# Patient Record
Sex: FEMALE | URBAN - METROPOLITAN AREA
[De-identification: names, ages, dates, MRNs, and addresses within clinical notes are randomized per-mention and may not be internally consistent; named-entity substitution may affect disease eponyms.]

---
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
## 2022-09-15 ENCOUNTER — APPOINTMENT (RX ONLY)
Dept: URBAN - METROPOLITAN AREA CLINIC 374 | Facility: CLINIC | Age: 22
Setting detail: DERMATOLOGY
End: 2022-09-15

## 2022-09-15 DIAGNOSIS — L71.0 PERIORAL DERMATITIS: ICD-10-CM

## 2022-09-15 PROCEDURE — ? COUNSELING

## 2022-09-15 PROCEDURE — 99203 OFFICE O/P NEW LOW 30 MIN: CPT

## 2022-09-15 PROCEDURE — ? PRESCRIPTION MEDICATION MANAGEMENT

## 2022-09-15 PROCEDURE — ? PRESCRIPTION

## 2022-09-15 RX ORDER — DOXYCYCLINE 100 MG/1
TABLET, FILM COATED ORAL QDAY
Qty: 30 | Refills: 1 | Status: ERX | COMMUNITY
Start: 2022-09-15

## 2022-09-15 RX ORDER — CLINDAMYCIN PHOSPHATE 10 MG/ML
LOTION TOPICAL QAM
Qty: 60 | Refills: 3 | Status: ERX | COMMUNITY
Start: 2022-09-15

## 2022-09-15 RX ORDER — HYDROCORTISONE 25 MG/ML
LOTION TOPICAL QHS
Qty: 118 | Refills: 3 | Status: ERX | COMMUNITY
Start: 2022-09-15

## 2022-09-15 RX ADMIN — HYDROCORTISONE: 25 LOTION TOPICAL at 00:00

## 2022-09-15 RX ADMIN — CLINDAMYCIN PHOSPHATE: 10 LOTION TOPICAL at 00:00

## 2022-09-15 RX ADMIN — DOXYCYCLINE: 100 TABLET, FILM COATED ORAL at 00:00

## 2022-09-15 ASSESSMENT — LOCATION DETAILED DESCRIPTION DERM
LOCATION DETAILED: NASAL DORSUM
LOCATION DETAILED: LEFT INFERIOR MEDIAL MALAR CHEEK
LOCATION DETAILED: RIGHT INFERIOR MEDIAL MALAR CHEEK

## 2022-09-15 ASSESSMENT — LOCATION ZONE DERM
LOCATION ZONE: NOSE
LOCATION ZONE: FACE

## 2022-09-15 ASSESSMENT — LOCATION SIMPLE DESCRIPTION DERM
LOCATION SIMPLE: RIGHT CHEEK
LOCATION SIMPLE: NOSE
LOCATION SIMPLE: LEFT CHEEK

## 2022-09-15 NOTE — PROCEDURE: PRESCRIPTION MEDICATION MANAGEMENT
Initiate Treatment: doxycycline monohydrate 100 mg tablet: Take 1 tab po QDAY with food x2 weeks\\n\\nclindamycin 1 % lotion: Apply a thin layer to affected area of face QAM until clear then DC\\n\\nhydrocortisone 2.5 % lotion: Apply a thin layer to AA of face QHS
Detail Level: Zone
Render In Strict Bullet Format?: No

## 2024-08-02 ENCOUNTER — TELEPHONE (OUTPATIENT)
Age: 24
End: 2024-08-02

## 2024-08-02 ENCOUNTER — OFFICE VISIT (OUTPATIENT)
Age: 24
End: 2024-08-02
Payer: COMMERCIAL

## 2024-08-02 VITALS
HEIGHT: 67 IN | BODY MASS INDEX: 18.27 KG/M2 | WEIGHT: 116.4 LBS | DIASTOLIC BLOOD PRESSURE: 70 MMHG | SYSTOLIC BLOOD PRESSURE: 118 MMHG

## 2024-08-02 DIAGNOSIS — E44.1 MILD PROTEIN-CALORIE MALNUTRITION (HCC): ICD-10-CM

## 2024-08-02 DIAGNOSIS — Z87.11 HX OF GASTRIC ULCER: ICD-10-CM

## 2024-08-02 DIAGNOSIS — R12 HEARTBURN: Primary | ICD-10-CM

## 2024-08-02 PROBLEM — K25.9 GASTRIC ULCER: Status: ACTIVE | Noted: 2021-10-07

## 2024-08-02 PROBLEM — H81.10 BPPV (BENIGN PAROXYSMAL POSITIONAL VERTIGO): Status: ACTIVE | Noted: 2018-11-15

## 2024-08-02 PROBLEM — E46 PROTEIN-CALORIE MALNUTRITION (HCC): Status: ACTIVE | Noted: 2021-10-07

## 2024-08-02 PROBLEM — K25.9 GASTRIC ULCER: Status: RESOLVED | Noted: 2021-10-07 | Resolved: 2024-08-02

## 2024-08-02 PROCEDURE — 99204 OFFICE O/P NEW MOD 45 MIN: CPT | Performed by: PHYSICIAN ASSISTANT

## 2024-08-02 RX ORDER — FAMOTIDINE 40 MG/1
40 TABLET, FILM COATED ORAL 2 TIMES DAILY
Qty: 60 TABLET | Refills: 1 | Status: SHIPPED | OUTPATIENT
Start: 2024-08-02

## 2024-08-02 RX ORDER — LANSOPRAZOLE 30 MG/1
CAPSULE, DELAYED RELEASE ORAL
COMMUNITY
Start: 2024-08-01

## 2024-08-02 NOTE — TELEPHONE ENCOUNTER
Scheduled date of EGD(as of today): 8/6/24  Physician performing EGD: DEEPA  Location of EGD: [facility]  Instructions reviewed with patient by: VIOLA  Clearances: N

## 2024-08-02 NOTE — PROGRESS NOTES
Formerly [facility] - Outpatient New Patient Initial Visit  Mariaelena Adames 24 y.o. female MRN: [id number]  Encounter: [id number]    ASSESSMENT AND PLAN:    1. Heartburn  New, daily, severe. Hx/o gastric ulcer  Recommend EGD to assess for erosive esophagitis, GERD, PUD, H pylori. Low suspicion for gastroparesis. Patient is agreeable. Procedure risks including perforation, bleeding, infection, missed lesion and preparation discussed in detail. No contraindications to perform procedure at [facility].   START famotidine 40 mg BID  Further medication recommendations pending EGD findings. Will hold off on PPI anticipating possible H pylori bx if PUD noted  Follow up in 2 weeks after EGD  - famotidine (PEPCID) 40 MG tablet; Take 1 tablet (40 mg total) by mouth 2 (two) times a day With breakfast and dinner  Dispense: 60 tablet; Refill: 1  - EGD; Future    2. Hx of gastric ulcer  Dx on EGD 10/7/2021 for hematemesis, treated with cautery. Bx negative for H pylori, CMV, metaplasia  - EGD; Future    3. Mild protein-calorie malnutrition (HCC)  Mild Protein Calorie Malnutrition in the setting of severe heartburn as evidenced by low BMI, treated with high protein diet.     Return for EGD.    Chief Complaint   Patient presents with    Heartburn     Pt has been experiencing significant heartburn for the last month. Previously diagnosed with peptic ulcer in 2021. Pt notes she is eating a very bland diet. She went to [facility] yesterday, prescribed Prevacid, has not started taking.       HPI:   Accompanied by self and history is obtained from self.    Mariaelena Adames is a 24 y.o. year old female with a PMH significant for peptic ulcer (2021) who presents as a new patient for initial evaluation of new heartburn.    HPI    Heartburn  Ongoing x1 month, every day  Problems with anything she eats, starts within an hour  Previously might have 1 day out of a month with heartburn but never like  this  Took 2 weeks of Prilosec OTC, got worse again when 2-week course complete  Mild relief with laying down  No wt loss    When dx with ulcer in 2021, woke up vomiting blood  Had 10/7/202, gastric ulcer treated with cautery  Bx negative for H pylori, CMV, metaplasia  Felt to be 2/2 stress    GI History:  Previous issues: as above  Blood thinners: ASA: no antiplatelet: no anticoagulation: no  NSAID use: none  Caffeine use: none  Tobacco/nicotine use: none  EtOH use: none  Cannabis use: none    Abdominal Surgical Hx: None  Family Hx: Denies first degree relatives with GI malignancies.  GI procedure Hx:  EGD: 10/7/202: gastric ulcer treated with cautery; bx negative for H pylori, CMV, metaplasia  Colonoscopy: none  GI imaging Hx: None    Review of Systems   Constitutional:  Negative for appetite change, chills, fever and unexpected weight change.   HENT:  Negative for sore throat, trouble swallowing and voice change.    Respiratory:  Negative for cough and choking.    Cardiovascular:  Negative for chest pain and leg swelling.   Gastrointestinal:  Positive for nausea. Negative for abdominal distention, abdominal pain, anal bleeding, blood in stool, constipation, diarrhea, rectal pain and vomiting.        +Heartburn, -Regurgitation   Skin:  Negative for pallor and rash.   Neurological:  Negative for weakness, light-headedness and headaches.   Psychiatric/Behavioral:  Negative for confusion and sleep disturbance. The patient is not nervous/anxious.        Historical Information   Past Medical History:   Diagnosis Date    Gastric ulcer 2021     Past Surgical History:   Procedure Laterality Date    UPPER GASTROINTESTINAL ENDOSCOPY  2021    [facility] [place]     Social History     Substance and Sexual Activity   Alcohol Use Never     Social History     Substance and Sexual Activity   Drug Use Never     Social History     Tobacco Use   Smoking Status Never    Passive exposure: Never   Smokeless Tobacco Never  "    Family History   Problem Relation Age of Onset    No Known Problems Mother     No Known Problems Father     No Known Problems Maternal Grandmother     No Known Problems Maternal Grandfather     No Known Problems Paternal Grandmother     No Known Problems Paternal Grandfather        Meds/Allergies     Current Outpatient Medications:     famotidine (PEPCID) 40 MG tablet    lansoprazole (PREVACID) 30 mg capsule    Allergies   Allergen Reactions    Sulfa Antibiotics Rash     uticaria       PHYSICAL EXAM:    Blood pressure 118/70, height 5' 7\" (1.702 m), weight 52.8 kg (116 lb 6.4 oz). Body mass index is 18.23 kg/m².    Physical Exam  Vitals and nursing note reviewed.   Constitutional:       General: She is not in acute distress.     Appearance: She is not toxic-appearing.   HENT:      Head: Normocephalic.      Mouth/Throat:      Mouth: Mucous membranes are moist.      Pharynx: Oropharynx is clear.   Eyes:      General: No scleral icterus.     Extraocular Movements: Extraocular movements intact.   Neck:      Trachea: Phonation normal.   Cardiovascular:      Rate and Rhythm: Normal rate and regular rhythm.      Heart sounds: No murmur heard.     No friction rub. No gallop.   Pulmonary:      Effort: Pulmonary effort is normal. No respiratory distress.      Breath sounds: No wheezing, rhonchi or rales.   Abdominal:      General: Bowel sounds are normal. There is no distension or abdominal bruit.      Palpations: Abdomen is soft. There is no hepatomegaly or splenomegaly.      Tenderness: There is no abdominal tenderness. There is no guarding or rebound.   Musculoskeletal:      Cervical back: Neck supple.      Comments: Moving all 4 extremities spontaneously   Skin:     General: Skin is warm and dry.      Capillary Refill: Capillary refill takes less than 2 seconds.      Coloration: Skin is not jaundiced or pale.   Neurological:      General: No focal deficit present.      Mental Status: She is alert and oriented to " person, place, and time.   Psychiatric:         Behavior: Behavior normal. Behavior is cooperative.         Thought Content: Thought content normal.         Lab Results:   No results found.    Radiology Results:   No results found.    I have spent a total time of 20 minutes on 08/02/24 in caring for this patient including Diagnostic results, Prognosis, Risks and benefits of tx options, Instructions for management, Patient and family education, Importance of tx compliance, Risk factor reductions, Impressions, Counseling / Coordination of care, Documenting in the medical record, Reviewing / ordering tests, medicine, procedures  , and Obtaining or reviewing history  .    Patient expressed understanding and had all questions and concerns addressed.    Susana Figueroa PA-C  08/02/24  9:00 AM    This chart was completed in part utilizing [employer] speech voice recognition software. Random word insertions, pronoun errors, and incomplete sentences are an occasional consequence of this system due to software limitations, and ambient noise. Any questions or concerns about the content, text, or information contained within the body of this dictation should be directly addressed to the provider for clarification.

## 2024-08-02 NOTE — PATIENT INSTRUCTIONS
Strategies to help with acid reflux:  Eat smaller more frequent meals  Wait 3 hours between eating and laying down  Raise the head of the bed six inches or 30 degrees  Foods to avoid if you are having more symptoms: spicy foods, fatty foods, hot dogs, ribs, sausages, cream, citrus fruit juices, garlic/onion, coffee/tea, tomato-based foods, chocolate, spearmint, peppermint, carbonated beverages (fizzy drinks), alcohol    Good foods for reflux:  High-fiber foods  Fibrous foods make you feel full so you're less likely to overeat, which may contribute to heartburn. So, load up on healthy fiber from these foods:    -Whole grains such as oatmeal, couscous and brown rice.  -Root vegetables such as sweet potatoes, carrots and beets.  -Green vegetables such as asparagus, broccoli and green beans.    Alkaline foods  Foods fall somewhere along the pH scale (an indicator of acid levels). Those that have a low pH are acidic and more likely to cause reflux. Those with higher pH are alkaline and can help offset strong stomach acid. Alkaline foods include:    -Bananas  -Melons  -Cauliflower  -Fennel  -Nuts    Watery foods  Eating foods that contain a lot of water can dilute and weaken stomach acid. Choose foods such as:    -Celery  -Cucumber  -Lettuce  -Watermelon  -Broth-based soups  -Herbal tea

## 2024-08-02 NOTE — ASSESSMENT & PLAN NOTE
Dx on EGD 10/7/2021, treated with cautery @ [facility] in [place], [place]  Bx negative for H pylori, CMV, metaplasia

## 2024-08-02 NOTE — H&P (VIEW-ONLY)
Formerly [facility] - Outpatient New Patient Initial Visit  Mariaelena Adames 24 y.o. female MRN: [id number]  Encounter: [id number]    ASSESSMENT AND PLAN:    1. Heartburn  New, daily, severe. Hx/o gastric ulcer  Recommend EGD to assess for erosive esophagitis, GERD, PUD, H pylori. Low suspicion for gastroparesis. Patient is agreeable. Procedure risks including perforation, bleeding, infection, missed lesion and preparation discussed in detail. No contraindications to perform procedure at [facility].   START famotidine 40 mg BID  Further medication recommendations pending EGD findings. Will hold off on PPI anticipating possible H pylori bx if PUD noted  Follow up in 2 weeks after EGD  - famotidine (PEPCID) 40 MG tablet; Take 1 tablet (40 mg total) by mouth 2 (two) times a day With breakfast and dinner  Dispense: 60 tablet; Refill: 1  - EGD; Future    2. Hx of gastric ulcer  Dx on EGD 10/7/2021 for hematemesis, treated with cautery. Bx negative for H pylori, CMV, metaplasia  - EGD; Future    3. Mild protein-calorie malnutrition (HCC)  Mild Protein Calorie Malnutrition in the setting of severe heartburn as evidenced by low BMI, treated with high protein diet.     Return for EGD.    Chief Complaint   Patient presents with    Heartburn     Pt has been experiencing significant heartburn for the last month. Previously diagnosed with peptic ulcer in 2021. Pt notes she is eating a very bland diet. She went to [facility] yesterday, prescribed Prevacid, has not started taking.       HPI:   Accompanied by self and history is obtained from self.    Mariaelena Adames is a 24 y.o. year old female with a PMH significant for peptic ulcer (2021) who presents as a new patient for initial evaluation of new heartburn.    HPI    Heartburn  Ongoing x1 month, every day  Problems with anything she eats, starts within an hour  Previously might have 1 day out of a month with heartburn but never like  this  Took 2 weeks of Prilosec OTC, got worse again when 2-week course complete  Mild relief with laying down  No wt loss    When dx with ulcer in 2021, woke up vomiting blood  Had 10/7/202, gastric ulcer treated with cautery  Bx negative for H pylori, CMV, metaplasia  Felt to be 2/2 stress    GI History:  Previous issues: as above  Blood thinners: ASA: no antiplatelet: no anticoagulation: no  NSAID use: none  Caffeine use: none  Tobacco/nicotine use: none  EtOH use: none  Cannabis use: none    Abdominal Surgical Hx: None  Family Hx: Denies first degree relatives with GI malignancies.  GI procedure Hx:  EGD: 10/7/202: gastric ulcer treated with cautery; bx negative for H pylori, CMV, metaplasia  Colonoscopy: none  GI imaging Hx: None    Review of Systems   Constitutional:  Negative for appetite change, chills, fever and unexpected weight change.   HENT:  Negative for sore throat, trouble swallowing and voice change.    Respiratory:  Negative for cough and choking.    Cardiovascular:  Negative for chest pain and leg swelling.   Gastrointestinal:  Positive for nausea. Negative for abdominal distention, abdominal pain, anal bleeding, blood in stool, constipation, diarrhea, rectal pain and vomiting.        +Heartburn, -Regurgitation   Skin:  Negative for pallor and rash.   Neurological:  Negative for weakness, light-headedness and headaches.   Psychiatric/Behavioral:  Negative for confusion and sleep disturbance. The patient is not nervous/anxious.        Historical Information   Past Medical History:   Diagnosis Date    Gastric ulcer 2021     Past Surgical History:   Procedure Laterality Date    UPPER GASTROINTESTINAL ENDOSCOPY  2021    [facility] [place]     Social History     Substance and Sexual Activity   Alcohol Use Never     Social History     Substance and Sexual Activity   Drug Use Never     Social History     Tobacco Use   Smoking Status Never    Passive exposure: Never   Smokeless Tobacco Never  "    Family History   Problem Relation Age of Onset    No Known Problems Mother     No Known Problems Father     No Known Problems Maternal Grandmother     No Known Problems Maternal Grandfather     No Known Problems Paternal Grandmother     No Known Problems Paternal Grandfather        Meds/Allergies     Current Outpatient Medications:     famotidine (PEPCID) 40 MG tablet    lansoprazole (PREVACID) 30 mg capsule    Allergies   Allergen Reactions    Sulfa Antibiotics Rash     uticaria       PHYSICAL EXAM:    Blood pressure 118/70, height 5' 7\" (1.702 m), weight 52.8 kg (116 lb 6.4 oz). Body mass index is 18.23 kg/m².    Physical Exam  Vitals and nursing note reviewed.   Constitutional:       General: She is not in acute distress.     Appearance: She is not toxic-appearing.   HENT:      Head: Normocephalic.      Mouth/Throat:      Mouth: Mucous membranes are moist.      Pharynx: Oropharynx is clear.   Eyes:      General: No scleral icterus.     Extraocular Movements: Extraocular movements intact.   Neck:      Trachea: Phonation normal.   Cardiovascular:      Rate and Rhythm: Normal rate and regular rhythm.      Heart sounds: No murmur heard.     No friction rub. No gallop.   Pulmonary:      Effort: Pulmonary effort is normal. No respiratory distress.      Breath sounds: No wheezing, rhonchi or rales.   Abdominal:      General: Bowel sounds are normal. There is no distension or abdominal bruit.      Palpations: Abdomen is soft. There is no hepatomegaly or splenomegaly.      Tenderness: There is no abdominal tenderness. There is no guarding or rebound.   Musculoskeletal:      Cervical back: Neck supple.      Comments: Moving all 4 extremities spontaneously   Skin:     General: Skin is warm and dry.      Capillary Refill: Capillary refill takes less than 2 seconds.      Coloration: Skin is not jaundiced or pale.   Neurological:      General: No focal deficit present.      Mental Status: She is alert and oriented to " person, place, and time.   Psychiatric:         Behavior: Behavior normal. Behavior is cooperative.         Thought Content: Thought content normal.         Lab Results:   No results found.    Radiology Results:   No results found.    I have spent a total time of 20 minutes on 08/02/24 in caring for this patient including Diagnostic results, Prognosis, Risks and benefits of tx options, Instructions for management, Patient and family education, Importance of tx compliance, Risk factor reductions, Impressions, Counseling / Coordination of care, Documenting in the medical record, Reviewing / ordering tests, medicine, procedures  , and Obtaining or reviewing history  .    Patient expressed understanding and had all questions and concerns addressed.    Susana Figueroa PA-C  08/02/24  9:00 AM    This chart was completed in part utilizing [employer] speech voice recognition software. Random word insertions, pronoun errors, and incomplete sentences are an occasional consequence of this system due to software limitations, and ambient noise. Any questions or concerns about the content, text, or information contained within the body of this dictation should be directly addressed to the provider for clarification.

## 2024-08-06 ENCOUNTER — ANESTHESIA (OUTPATIENT)
Dept: GASTROENTEROLOGY | Facility: AMBULATORY SURGERY CENTER | Age: 24
End: 2024-08-06

## 2024-08-06 ENCOUNTER — ANESTHESIA EVENT (OUTPATIENT)
Dept: GASTROENTEROLOGY | Facility: AMBULATORY SURGERY CENTER | Age: 24
End: 2024-08-06

## 2024-08-06 ENCOUNTER — ANESTHESIA EVENT (OUTPATIENT)
Dept: ANESTHESIOLOGY | Facility: AMBULATORY SURGERY CENTER | Age: 24
End: 2024-08-06

## 2024-08-06 ENCOUNTER — HOSPITAL ENCOUNTER (OUTPATIENT)
Dept: GASTROENTEROLOGY | Facility: AMBULATORY SURGERY CENTER | Age: 24
Discharge: HOME | End: 2024-08-06
Payer: COMMERCIAL

## 2024-08-06 ENCOUNTER — ANESTHESIA (OUTPATIENT)
Dept: ANESTHESIOLOGY | Facility: AMBULATORY SURGERY CENTER | Age: 24
End: 2024-08-06

## 2024-08-06 VITALS
HEIGHT: 67 IN | BODY MASS INDEX: 18.21 KG/M2 | HEART RATE: 69 BPM | SYSTOLIC BLOOD PRESSURE: 99 MMHG | DIASTOLIC BLOOD PRESSURE: 68 MMHG | RESPIRATION RATE: 20 BRPM | OXYGEN SATURATION: 98 % | WEIGHT: 116 LBS | TEMPERATURE: 98 F

## 2024-08-06 DIAGNOSIS — Z87.11 HX OF GASTRIC ULCER: ICD-10-CM

## 2024-08-06 DIAGNOSIS — R12 HEARTBURN: ICD-10-CM

## 2024-08-06 LAB
EXT PREGNANCY TEST URINE: NEGATIVE
EXT. CONTROL: NORMAL

## 2024-08-06 PROCEDURE — 43239 EGD BIOPSY SINGLE/MULTIPLE: CPT | Performed by: INTERNAL MEDICINE

## 2024-08-06 PROCEDURE — 88305 TISSUE EXAM BY PATHOLOGIST: CPT | Performed by: PATHOLOGY

## 2024-08-06 RX ORDER — OMEPRAZOLE 40 MG/1
40 CAPSULE, DELAYED RELEASE ORAL DAILY
Qty: 30 CAPSULE | Refills: 2 | Status: SHIPPED | OUTPATIENT
Start: 2024-08-06

## 2024-08-06 RX ORDER — SODIUM CHLORIDE, SODIUM LACTATE, POTASSIUM CHLORIDE, CALCIUM CHLORIDE 600; 310; 30; 20 MG/100ML; MG/100ML; MG/100ML; MG/100ML
50 INJECTION, SOLUTION INTRAVENOUS CONTINUOUS
Status: DISCONTINUED | OUTPATIENT
Start: 2024-08-06 | End: 2024-08-10 | Stop reason: HOSPADM

## 2024-08-06 RX ORDER — PROPOFOL 10 MG/ML
INJECTION, EMULSION INTRAVENOUS AS NEEDED
Status: DISCONTINUED | OUTPATIENT
Start: 2024-08-06 | End: 2024-08-06

## 2024-08-06 RX ADMIN — SODIUM CHLORIDE, SODIUM LACTATE, POTASSIUM CHLORIDE, CALCIUM CHLORIDE 50 ML/HR: 600; 310; 30; 20 INJECTION, SOLUTION INTRAVENOUS at 10:29

## 2024-08-06 RX ADMIN — PROPOFOL 50 MG: 10 INJECTION, EMULSION INTRAVENOUS at 11:02

## 2024-08-06 RX ADMIN — SODIUM CHLORIDE, SODIUM LACTATE, POTASSIUM CHLORIDE, CALCIUM CHLORIDE: 600; 310; 30; 20 INJECTION, SOLUTION INTRAVENOUS at 11:06

## 2024-08-06 RX ADMIN — PROPOFOL 50 MG: 10 INJECTION, EMULSION INTRAVENOUS at 11:00

## 2024-08-06 RX ADMIN — PROPOFOL 200 MG: 10 INJECTION, EMULSION INTRAVENOUS at 10:59

## 2024-08-06 NOTE — ANESTHESIA POSTPROCEDURE EVALUATION
Post-Op Assessment Note    CV Status:  Stable  Pain Score: 0    Pain management: adequate       Mental Status:  Alert and awake   Hydration Status:  Euvolemic   PONV Controlled:  Controlled   Airway Patency:  Patent     Post Op Vitals Reviewed: Yes    No anethesia notable event occurred.    Staff: CRNA               BP   108/73   Temp   98   Pulse  78   Resp   16   SpO2   98

## 2024-08-06 NOTE — ANESTHESIA PREPROCEDURE EVALUATION
Procedure:  EGD    Relevant Problems   PULMONARY   (-) Smoking   (-) URI (upper respiratory infection)      Ear/Nose/Throat   (+) BPPV (benign paroxysmal positional vertigo)      Other   (+) Hx of gastric ulcer   (+) Protein-calorie malnutrition (HCC)        Physical Exam    Airway    Mallampati score: II  TM Distance: >3 FB  Neck ROM: full     Dental   No notable dental hx     Cardiovascular      Pulmonary      Other Findings  post-pubertal.      Anesthesia Plan  ASA Score- 2     Anesthesia Type- IV sedation with anesthesia with ASA Monitors.         Additional Monitors:     Airway Plan:            Plan Factors-Exercise tolerance (METS): >4 METS.    Chart reviewed.    Patient summary reviewed.    Patient is not a current smoker.              Induction- intravenous.    Postoperative Plan-         Informed Consent- Anesthetic plan and risks discussed with patient.  I personally reviewed this patient with the CRNA. Discussed and agreed on the Anesthesia Plan with the CRNA..

## 2024-08-06 NOTE — INTERVAL H&P NOTE
H&P reviewed. After examining the patient I find no changes in the patients condition since the H&P had been written.    Vitals:    08/06/24 1014   BP: 142/85   Pulse: 100   Resp: 18   Temp: 98 °F (36.7 °C)   SpO2: 100%

## 2024-08-08 PROCEDURE — 88305 TISSUE EXAM BY PATHOLOGIST: CPT | Performed by: PATHOLOGY

## 2024-08-09 NOTE — RESULT ENCOUNTER NOTE
Discussed with mom, no EGD recall  Gastric biopsies negative for H. Pylori  Esophageal biopsies show eosinophils at the EG junction not present more proximally -consistent with reflux rather than eosinophilic esophagitis.  Continue recently-prescribed omeprazole but no indication for chronic acid suppression.  Keep upcoming office visit.

## 2024-08-30 ENCOUNTER — OFFICE VISIT (OUTPATIENT)
Age: 24
End: 2024-08-30
Payer: COMMERCIAL

## 2024-08-30 VITALS
HEIGHT: 67 IN | SYSTOLIC BLOOD PRESSURE: 112 MMHG | DIASTOLIC BLOOD PRESSURE: 64 MMHG | BODY MASS INDEX: 18.71 KG/M2 | WEIGHT: 119.2 LBS

## 2024-08-30 DIAGNOSIS — R12 HEARTBURN: Primary | ICD-10-CM

## 2024-08-30 DIAGNOSIS — Z87.11 HX OF GASTRIC ULCER: ICD-10-CM

## 2024-08-30 PROCEDURE — 99214 OFFICE O/P EST MOD 30 MIN: CPT | Performed by: PHYSICIAN ASSISTANT

## 2024-08-30 NOTE — PROGRESS NOTES
"[facility] - Outpatient Follow-up Note  Mariaelena Adames 24 y.o. female MRN: [id number]  Encounter: [id number]    ASSESSMENT AND PLAN:    1. Heartburn  Significantly improved on omeprazole 40 mg QAM  Continue omeprazole 40 mg QAM x90 days, then stop  If sx well controlled, d/c PPI  If sx return, would trial omeprazole 20 mg QAM  Recommend diet/lifestyle modifications - handout(s) provided today  Follow up in 1 year, sooner if concerns    2. Hx of gastric ulcer  Dx on EGD 10/7/2021 for hematemesis, treated with cautery. Bx (-) H pylori, CMV, metaplasia  No ulcers, H pylori on most recent EGD 8/6/2024     Return in about 1 year (around 8/30/2025).  ______________________________________________________________________    Chief Complaint   Patient presents with    EGD Follow up     Pt states she is doing well, symptoms improved on omeprazole.       HPI:   Accompanied by self and history is obtained from self.    Patient is a 24 y.o. female with a significant PMH of peptic ulcer (2021) presenting for follow up regarding heartburn.    HPI    Heartburn  Last seen 8/2 by myself for the same; started on famotidine 40 mg BID  S/p EGD 8/6: bx consistent with reflux esophagitis; no EOE or H pylori  Rx omeprazole 40 mg QAM x90 days but no need for chronic acid suppression per Dr. Chi  -----  Feeling fine on omeprazole alone right now  Forgot to take it for 2-3 days while on vacation, felt a little crummy but now good    Workup to date:   EGD: 8/6/2024: \"IMPRESSION:  Normal duodenum  Mild gastritis biopsy for H. Pylori  Short-segment irregular Z-line biopsied for Jaime's  Mild linear furrowing in the mid and upper esophagus biopsied for eosinophilic esophagitis\"  Bx: consistent with reflux, not EOE; (-) H pylori  Colonoscopy: none  Recent GI imaging: None    Review of Systems   Constitutional:  Negative for appetite change, chills, fever and unexpected weight change.   HENT:  Negative " "for sore throat, trouble swallowing and voice change.    Respiratory:  Negative for cough and choking.    Cardiovascular:  Negative for chest pain and leg swelling.   Gastrointestinal:  Negative for abdominal distention, abdominal pain, anal bleeding, blood in stool, constipation, diarrhea, nausea, rectal pain and vomiting.        +Heartburn, -Regurgitation   Skin:  Negative for pallor and rash.   Neurological:  Negative for weakness, light-headedness and headaches.   Psychiatric/Behavioral:  Negative for confusion and sleep disturbance. The patient is not nervous/anxious.        Historical Information   Past Medical History:   Diagnosis Date    Gastric ulcer 2021     Past Surgical History:   Procedure Laterality Date    UPPER GASTROINTESTINAL ENDOSCOPY  2021    [facility]    WISDOM TOOTH EXTRACTION       Social History     Substance and Sexual Activity   Alcohol Use Never     Social History     Substance and Sexual Activity   Drug Use Never     Social History     Tobacco Use   Smoking Status Never    Passive exposure: Never   Smokeless Tobacco Never     Family History   Problem Relation Age of Onset    No Known Problems Mother     No Known Problems Father     No Known Problems Maternal Grandmother     No Known Problems Maternal Grandfather     No Known Problems Paternal Grandmother     No Known Problems Paternal Grandfather     Colon cancer Neg Hx          Current Outpatient Medications:     famotidine (PEPCID) 40 MG tablet    omeprazole (PriLOSEC) 40 MG capsule  Allergies   Allergen Reactions    Sulfa Antibiotics Rash     uticaria     Reviewed medications and allergies and updated as indicated    PHYSICAL EXAM:    Blood pressure 112/64, height 5' 7\" (1.702 m), weight 54.1 kg (119 lb 3.2 oz), last menstrual period 08/04/2024. Body mass index is 18.67 kg/m².    Physical Exam  Vitals and nursing note reviewed.   Constitutional:       General: She is not in acute distress.     Appearance: She is " not toxic-appearing.   HENT:      Head: Normocephalic.      Mouth/Throat:      Mouth: Mucous membranes are moist.      Pharynx: Oropharynx is clear.   Eyes:      General: No scleral icterus.     Extraocular Movements: Extraocular movements intact.   Neck:      Trachea: Phonation normal.   Cardiovascular:      Comments: Appears well perfused  Pulmonary:      Effort: Pulmonary effort is normal. No respiratory distress.   Musculoskeletal:      Cervical back: Neck supple.      Comments: Moving all 4 extremities spontaneously   Skin:     General: Skin is warm and dry.      Capillary Refill: Capillary refill takes less than 2 seconds.      Coloration: Skin is not jaundiced or pale.   Neurological:      General: No focal deficit present.      Mental Status: She is alert and oriented to person, place, and time.   Psychiatric:         Behavior: Behavior normal. Behavior is cooperative.         Thought Content: Thought content normal.         Lab Results:   No results found.    Radiology Results:   EGD    Addendum Date: 8/6/2024 Addendum:   [facility] [address] [place] [id number] [phone] DATE OF SERVICE: 8/06/24 PHYSICIAN(S): Attending: Luiz Chi DO Fellow: No Staff Documented INDICATION: Heartburn, Hx of gastric ulcer POST-OP DIAGNOSIS: See the impression below. PREPROCEDURE: Informed consent was obtained for the procedure, including sedation.  Risks of perforation, hemorrhage, adverse drug reaction and aspiration were discussed. The patient was placed in the left lateral decubitus position. Patient was explained about the risks and benefits of the procedure. Risks including but not limited to bleeding, infection, and perforation were explained in detail. Also explained about less than 100% sensitivity with the exam and other alternatives. PROCEDURE: EGD DETAILS OF PROCEDURE: Patient was taken to the procedure room where a time out was performed to confirm correct  patient and correct procedure. The patient underwent monitored anesthesia care, which was administered by an anesthesia professional. The patient's blood pressure, heart rate, level of consciousness, respirations, oxygen, ECG and ETCO2 were monitored throughout the procedure. The scope was introduced through the mouth and advanced to the third part of the duodenum. Retroflexion was performed in the fundus. The patient experienced no blood loss. The procedure was not difficult. The patient tolerated the procedure well. There were no apparent adverse events. ANESTHESIA INFORMATION: ASA: II Anesthesia Type: IV Sedation with Anesthesia MEDICATIONS: lactated ringers infusion 350 mL*  *From user-documented volume (Totals for administrations occurring from 1046 to 1107 on 08/06/24) FINDINGS: The duodenum appeared normal. Mild erythematous mucosa in the antrum, consistent with gastritis; performed cold forceps biopsy to rule out H. pylori Mild salmon-colored mucosa measuring 10 mm in the Z-line (36 cm from the incisors); performed cold forceps biopsy to rule out Jaime's esophagus Mild abnormal mucosa with linear furrows in the middle third of the esophagus, consistent with eosinophilic esophagitis; performed cold forceps biopsy SPECIMENS: * No specimens in log * IMPRESSION: Normal duodenum Mild gastritis biopsy for H. Pylori Short-segment irregular Z-line biopsied for Jaime's Mild linear furrowing in the mid and upper esophagus biopsied for eosinophilic esophagitis RECOMMENDATION: Start omeprazole once daily, prescription sent to pharmacy Endoscopist will call with biopsy results within 2 weeks Keep upcoming office visit   Luiz Chi DO     Result Date: 8/6/2024  Narrative: Table formatting from the original result was not included. [facility] [address] [place] [id number] [phone] DATE OF SERVICE: 8/06/24 PHYSICIAN(S): Attending: Luiz Chi DO Fellow: No Staff  Documented INDICATION: Heartburn, Hx of gastric ulcer POST-OP DIAGNOSIS: See the impression below. PREPROCEDURE: Informed consent was obtained for the procedure, including sedation.  Risks of perforation, hemorrhage, adverse drug reaction and aspiration were discussed. The patient was placed in the left lateral decubitus position. Patient was explained about the risks and benefits of the procedure. Risks including but not limited to bleeding, infection, and perforation were explained in detail. Also explained about less than 100% sensitivity with the exam and other alternatives. PROCEDURE: EGD DETAILS OF PROCEDURE: Patient was taken to the procedure room where a time out was performed to confirm correct patient and correct procedure. The patient underwent monitored anesthesia care, which was administered by an anesthesia professional. The patient's blood pressure, heart rate, level of consciousness, respirations, oxygen, ECG and ETCO2 were monitored throughout the procedure. The scope was introduced through the mouth and advanced to the third part of the duodenum. Retroflexion was performed in the fundus. The patient experienced no blood loss. The procedure was not difficult. The patient tolerated the procedure well. There were no apparent adverse events. ANESTHESIA INFORMATION: ASA: II Anesthesia Type: IV Sedation with Anesthesia MEDICATIONS: lactated ringers infusion 350 mL*  *From user-documented volume (Totals for administrations occurring from 1046 to 1107 on 08/06/24) FINDINGS: The duodenum appeared normal. Mild erythematous mucosa in the antrum, consistent with gastritis; performed cold forceps biopsy to rule out H. pylori Mild salmon-colored mucosa measuring 10 mm in the Z-line (36 cm from the incisors); performed cold forceps biopsy to rule out Jaime's esophagus Mild abnormal mucosa with linear furrows in the middle third of the esophagus, consistent with eosinophilic esophagitis; performed cold forceps  biopsy SPECIMENS: * No specimens in log *     Impression: Normal duodenum Mild gastritis biopsy for H. Pylori Short-segment irregular Z-line biopsied for Jaime's Mild linear furrowing in the mid and upper esophagus biopsied for eosinophilic esophagitis RECOMMENDATION: Start omeprazole once daily, prescription sent to pharmacy Endoscopist will call with biopsy results within 2 weeks Keep upcoming office visit   Luiz Chi DO       I have spent a total time of 20 minutes on 08/30/24 in caring for this patient including Diagnostic results, Prognosis, Risks and benefits of tx options, Instructions for management, Patient and family education, Importance of tx compliance, Risk factor reductions, Impressions, Counseling / Coordination of care, Documenting in the medical record, Reviewing / ordering tests, medicine, procedures  , and Obtaining or reviewing history  .    Patient expressed understanding and had all questions and concerns addressed.    Susana Figueroa PA-C  08/30/24  11:55 AM    This chart was completed in part utilizing [employer] speech voice recognition software. Random word insertions, pronoun errors, and incomplete sentences are an occasional consequence of this system due to software limitations, and ambient noise. Any questions or concerns about the content, text, or information contained within the body of this dictation should be directly addressed to the provider for clarification.       none